# Patient Record
Sex: FEMALE | Race: BLACK OR AFRICAN AMERICAN | NOT HISPANIC OR LATINO | RURAL
[De-identification: names, ages, dates, MRNs, and addresses within clinical notes are randomized per-mention and may not be internally consistent; named-entity substitution may affect disease eponyms.]

---

## 2022-08-03 ENCOUNTER — HOSPITAL ENCOUNTER (OUTPATIENT)
Dept: RADIOLOGY | Facility: HOSPITAL | Age: 72
Discharge: HOME | End: 2022-08-03
Attending: ORTHOPAEDIC SURGERY
Payer: COMMERCIAL

## 2022-08-03 DIAGNOSIS — M25.562 ACUTE PAIN OF LEFT KNEE: ICD-10-CM

## 2022-08-03 PROCEDURE — 73562 X-RAY EXAM OF KNEE 3: CPT | Mod: TC,LT

## 2022-08-10 ENCOUNTER — OFFICE VISIT (OUTPATIENT)
Dept: SURGERY | Facility: CLINIC | Age: 72
End: 2022-08-10
Payer: COMMERCIAL

## 2022-08-10 VITALS — WEIGHT: 220 LBS | HEIGHT: 67 IN | HEART RATE: 71 BPM | OXYGEN SATURATION: 99 % | BODY MASS INDEX: 34.53 KG/M2

## 2022-08-10 DIAGNOSIS — I70.202 STENOSIS OF LEFT POPLITEAL ARTERY: ICD-10-CM

## 2022-08-10 PROCEDURE — 3008F BODY MASS INDEX DOCD: CPT | Mod: CPTII,,, | Performed by: SURGERY

## 2022-08-10 PROCEDURE — 99214 OFFICE O/P EST MOD 30 MIN: CPT | Mod: PBBFAC | Performed by: SURGERY

## 2022-08-10 PROCEDURE — 3008F PR BODY MASS INDEX (BMI) DOCUMENTED: ICD-10-PCS | Mod: CPTII,,, | Performed by: SURGERY

## 2022-08-10 PROCEDURE — 99202 PR OFFICE/OUTPT VISIT, NEW, LEVL II, 15-29 MIN: ICD-10-PCS | Mod: S$PBB,,, | Performed by: SURGERY

## 2022-08-10 PROCEDURE — 99202 OFFICE O/P NEW SF 15 MIN: CPT | Mod: S$PBB,,, | Performed by: SURGERY

## 2022-08-10 PROCEDURE — 1159F MED LIST DOCD IN RCRD: CPT | Mod: CPTII,,, | Performed by: SURGERY

## 2022-08-10 PROCEDURE — 1159F PR MEDICATION LIST DOCUMENTED IN MEDICAL RECORD: ICD-10-PCS | Mod: CPTII,,, | Performed by: SURGERY

## 2022-08-10 NOTE — PROGRESS NOTES
"Subjective:       Patient ID: Yasmine Morris is a 71 y.o. female.    Chief Complaint: Consult (Left leg)  This patient has had probably ruptured Baker cyst in the last several months with swelling and discomfort send arterial her ultrasound evaluation suggesting some popliteal artery disease.  Denies any real claudication by history she is a nonsmoker nondiabetic and no previous vascular interventions    She has palpable dorsalis pedis pulse on this left leg foot is clean healthy normal capillary refill  family history includes Anuerysm in her mother; Cancer in her brother; Diabetes in her brother, father, and sister.  Past Medical History:   Diagnosis Date    Hyperlipidemia     Hypertension       History reviewed. No pertinent surgical history.    reports that she has never smoked. She has never used smokeless tobacco. She reports that she does not drink alcohol and does not use drugs.   HPI  Review of Systems      Objective:      Pulse 71   Ht 5' 7" (1.702 m)   Wt 99.8 kg (220 lb)   SpO2 99%   BMI 34.46 kg/m²    Physical Exam  Constitutional:       Appearance: Normal appearance.   HENT:      Head: Normocephalic.      Nose: Nose normal.   Eyes:      Extraocular Movements: Extraocular movements intact.   Cardiovascular:      Rate and Rhythm: Normal rate.      Comments: Palpable dorsalis pedis 2+ left  Musculoskeletal:      Right lower leg: No edema.      Left lower leg: No edema.   Skin:     General: Skin is warm and dry.      Capillary Refill: Capillary refill takes less than 2 seconds.   Neurological:      General: No focal deficit present.      Mental Status: She is alert.   Psychiatric:         Mood and Affect: Mood normal.         Behavior: Behavior normal.         Thought Content: Thought content normal.         Judgment: Judgment normal.           Assessment:       1. Stenosis of left popliteal artery        Plan:       Arterial Dopplers with ABIs       "

## 2022-08-22 ENCOUNTER — HOSPITAL ENCOUNTER (OUTPATIENT)
Dept: RADIOLOGY | Facility: HOSPITAL | Age: 72
Discharge: HOME | End: 2022-08-22
Attending: SURGERY
Payer: COMMERCIAL

## 2022-08-22 DIAGNOSIS — I73.9 PERIPHERAL VASCULAR DISEASE: ICD-10-CM

## 2022-08-22 DIAGNOSIS — I70.202 STENOSIS OF LEFT POPLITEAL ARTERY: ICD-10-CM

## 2022-08-22 PROCEDURE — 93923 UPR/LXTR ART STDY 3+ LVLS: CPT | Mod: TC

## 2022-08-22 PROCEDURE — 93926 LOWER EXTREMITY STUDY: CPT | Mod: 26,LT,, | Performed by: SURGERY

## 2022-08-22 PROCEDURE — 93926 US LOWER EXTREMITY ARTERIES LEFT: ICD-10-PCS | Mod: 26,LT,, | Performed by: SURGERY

## 2022-08-22 PROCEDURE — 93926 LOWER EXTREMITY STUDY: CPT | Mod: TC,LT

## 2022-08-22 PROCEDURE — 93923 US ARTERIAL DOPPLER EXAM: ICD-10-PCS | Mod: 26,,, | Performed by: SURGERY

## 2022-08-22 PROCEDURE — 93923 UPR/LXTR ART STDY 3+ LVLS: CPT | Mod: 26,,, | Performed by: SURGERY
